# Patient Record
Sex: MALE | Race: WHITE
[De-identification: names, ages, dates, MRNs, and addresses within clinical notes are randomized per-mention and may not be internally consistent; named-entity substitution may affect disease eponyms.]

---

## 2017-09-18 ENCOUNTER — HOSPITAL ENCOUNTER (OUTPATIENT)
Dept: HOSPITAL 45 - C.EDB | Age: 22
Setting detail: OBSERVATION
LOS: 1 days | Discharge: HOME | End: 2017-09-19
Attending: SURGERY | Admitting: SURGERY
Payer: COMMERCIAL

## 2017-09-18 VITALS
BODY MASS INDEX: 20.75 KG/M2 | HEIGHT: 72 IN | BODY MASS INDEX: 20.75 KG/M2 | WEIGHT: 153.22 LBS | HEIGHT: 72 IN | WEIGHT: 153.22 LBS

## 2017-09-18 VITALS
SYSTOLIC BLOOD PRESSURE: 122 MMHG | HEART RATE: 64 BPM | OXYGEN SATURATION: 94 % | TEMPERATURE: 99.14 F | DIASTOLIC BLOOD PRESSURE: 70 MMHG

## 2017-09-18 VITALS — OXYGEN SATURATION: 95 %

## 2017-09-18 DIAGNOSIS — K35.80: Primary | ICD-10-CM

## 2017-09-18 LAB
ALP SERPL-CCNC: 97 U/L (ref 45–117)
ALT SERPL-CCNC: 110 U/L (ref 12–78)
ANION GAP SERPL CALC-SCNC: 12 MMOL/L (ref 3–11)
AST SERPL-CCNC: 182 U/L (ref 15–37)
BASOPHILS # BLD: 0.02 K/UL (ref 0–0.2)
BASOPHILS NFR BLD: 0.1 %
BUN SERPL-MCNC: 14 MG/DL (ref 7–18)
BUN/CREAT SERPL: 12 (ref 10–20)
CALCIUM SERPL-MCNC: 9.7 MG/DL (ref 8.5–10.1)
CHLORIDE SERPL-SCNC: 103 MMOL/L (ref 98–107)
CO2 SERPL-SCNC: 22 MMOL/L (ref 21–32)
COMPLETE: YES
CREAT CL PREDICTED SERPL C-G-VRATE: 95.7 ML/MIN
CREAT SERPL-MCNC: 1.2 MG/DL (ref 0.6–1.4)
EOSINOPHIL NFR BLD AUTO: 209 K/UL (ref 130–400)
GLUCOSE SERPL-MCNC: 118 MG/DL (ref 70–99)
HCT VFR BLD CALC: 50.4 % (ref 42–52)
IG%: 0.3 %
IMM GRANULOCYTES NFR BLD AUTO: 9.6 %
LYMPHOCYTES # BLD: 1.76 K/UL (ref 1.2–3.4)
MCH RBC QN AUTO: 31.2 PG (ref 25–34)
MCHC RBC AUTO-ENTMCNC: 35.9 G/DL (ref 32–36)
MCV RBC AUTO: 86.7 FL (ref 80–100)
MONOCYTES NFR BLD: 4.3 %
NEUTROPHILS # BLD AUTO: 0 %
NEUTROPHILS NFR BLD AUTO: 85.7 %
PMV BLD AUTO: 10.9 FL (ref 7.4–10.4)
POTASSIUM SERPL-SCNC: 4.2 MMOL/L (ref 3.5–5.1)
RBC # BLD AUTO: 5.81 M/UL (ref 4.7–6.1)
SODIUM SERPL-SCNC: 137 MMOL/L (ref 136–145)
WBC # BLD AUTO: 18.34 K/UL (ref 4.8–10.8)

## 2017-09-18 RX ADMIN — SODIUM CHLORIDE, SODIUM LACTATE, POTASSIUM CHLORIDE, AND CALCIUM CHLORIDE SCH MLS/HR: 600; 310; 30; 20 INJECTION, SOLUTION INTRAVENOUS at 23:47

## 2017-09-18 NOTE — EMERGENCY ROOM VISIT NOTE
History


Report prepared by Odalis:  Jorge Salgado


Under the Supervision of:  Dr. Dakotah Pack M.D.


First contact with patient:  16:43


Chief Complaint:  VOMITING


Stated Complaint:  DEHYDRATION - SEVERE STOMACH PAIN


Nursing Triage Summary:  


Pt c/o upper right abdominal pain since 0100 and has vomited "bile at least 30 


times since then". 





Pt denies diarrhea.





History of Present Illness


The patient is a 22 year old male who presents to the Emergency Room with 

complaints of sharp RLQ abdominal pain that began 16 hours ago. He rates his 

pain a 7/10 in severity. He states that his pain does not wrap around into the 

patient's back. He has been experiencing multiple episodes of vomiting which he 

describes as bright yellow in color. He denies any fevers, diarrhea, melena, 

hematochezia, or hematemesis. He notes that he has not urinated for quite some 

time. He denies any current medical problems.





   Source of History:  patient


   Onset:  16 hours ago


   Position:  abdomen (RLQ)


   Symptom Intensity:  7/10


   Quality:  sharp


   Timing:  constant


   Associated Symptoms:  + vomiting, No fevers, No melena, No hematochezia, No 

diarrhea





Review of Systems


See HPI for pertinent positives & negatives. A total of 10 systems reviewed and 

were otherwise negative.





Past Medical & Surgical


Medical Problems:


(1) No Known Active Medical Problems








Family History





No significant family history





Social History


Smoking Status:  Never Smoker


Smokeless Tobacco Use:  No


Alcohol Use:  occasionally


Drug Use:  marijuana


Marital Status:  single


Housing Status:  lives with friends


Occupation Status:  [employer] student





Current/Historical Medications


No Active Prescriptions or Reported Meds





Allergies


Coded Allergies:  


     No Known Allergies (Unverified , 9/18/17)





Physical Exam


Vital Signs











  Date Time  Temp Pulse Resp B/P (MAP) Pulse Ox O2 Delivery O2 Flow Rate FiO2


 


9/18/17 18:03  82 18 146/76 98 Room Air  


 


9/18/17 16:39 36.6 98 20 127/81 100 Room Air  











Physical Exam


Constitutional: Vital signs reviewed.


Eyes: Pupils are equal round reactive to light.  Conjunctiva are noninjected.  


ENT: Pharynx is clear without erythema or exudate.  Mucous membranes are moist.

  Neck supple without meningeal signs.


Respiratory: Clear to auscultation bilaterally.  Breath sounds are equal 

bilaterally. 


Cardiovascular: Regular rate and rhythm.  No rubs or gallops.


GI: Soft, nondistended with RLQ tenderness to palpation. No guarding.  Bowel 

sounds are present.


Musculoskeletal: No peripheral edema.  


Integumentary: No cyanosis.


Neurological: The patient is awake and alert.  No focal deficits.


Psychiatric: Normal affect.





Medical Decision & Procedures


ER Provider


Diagnostic Interpretation:


Radiology results as stated below per my review and the radiologist's 

interpretation:





CT SCAN OF THE ABDOMEN AND PELVIS WITH IV CONTRAST





CLINICAL HISTORY:   Right lower quadrant abdominal pain.





COMPARISON STUDY:  No priors.





TECHNIQUE: Following the IV administration of  120 cc of Optiray 320, CT scan of


the abdomen and pelvis is performed from the lung bases to the proximal femora.


Images are reviewed in the axial, sagittal, and coronal planes. IV contrast was


administered without complication. A dose lowering technique was utilized


adhering to the principles of ALARA. The Examination is significantly degraded


by motion artifact.





CT DOSE: 295.84 mGy.cm





FINDINGS:





Lung bases: The heart is normal in size and without pericardial effusion. The


lung bases are clear.





Liver: The contrast-enhanced liver is normal in size, contour, and attenuation.


There is no intrahepatic biliary ductal dilatation. The hepatic veins and portal


veins are patent.





Gallbladder: Unremarkable.





Spleen: Normal in size and attenuation.





Pancreas: Unremarkable.





Adrenal glands: Unremarkable.





Kidneys: The contrast enhanced kidneys are normal in size and without


hydronephrosis. The kidneys enhance symmetrically. There is a circumaortic left


renal vein.





Abdominal vasculature: The abdominal aorta is normal in course and caliber.





Bowel: The small bowel and colon are normal in course and caliber. The appendix


is  distended and fluid-filled, measuring up to 1.3 cm diameter. This is best


seen on axial image #343. A large calcified appendicolith is seen on image #340.


There is is mild appendiceal wall thickening as well as periappendiceal


inflammation, and the appearance is consistent with acute appendicitis.





Peritoneum: There is no intraperitoneal free air. A small volume of free fluid


is seen in the pelvis.





Lymphadenopathy: None.





Pelvic viscera: The bladder, prostate, and seminal vesicles are normal as


imaged. Bilateral hydroceles are suspected.





Skeletal structures: No lytic or blastic lesions are seen. There is a left-sided


pars defect at L5.








IMPRESSION:





1. Findings are consistent with acute appendicitis. There is no intraperitoneal


free air or evidence of abscess.





2. There is a small volume of free fluid in the pelvis, likely on a reactive


basis.





Electronically signed by:  Lester Garcia M.D.


9/18/2017 6:05 PM





Dictated Date/Time:  9/18/2017 5:59 PM





Laboratory Results


9/18/17 16:50








Red Blood Count 5.81, Mean Corpuscular Volume 86.7, Mean Corpuscular Hemoglobin 

31.2, Mean Corpuscular Hemoglobin Concent 35.9, Mean Platelet Volume 10.9, 

Neutrophils (%) (Auto) 85.7, Lymphocytes (%) (Auto) 9.6, Monocytes (%) (Auto) 

4.3, Eosinophils (%) (Auto) 0.0, Basophils (%) (Auto) 0.1, Neutrophils # (Auto) 

15.73, Lymphocytes # (Auto) 1.76, Monocytes # (Auto) 0.78, Eosinophils # (Auto) 

0.00, Basophils # (Auto) 0.02





9/18/17 16:50

















Test


  9/18/17


16:50


 


White Blood Count


  18.34 K/uL


(4.8-10.8)


 


Red Blood Count


  5.81 M/uL


(4.7-6.1)


 


Hemoglobin


  18.1 g/dL


(14.0-18.0)


 


Hematocrit 50.4 % (42-52) 


 


Mean Corpuscular Volume


  86.7 fL


()


 


Mean Corpuscular Hemoglobin


  31.2 pg


(25-34)


 


Mean Corpuscular Hemoglobin


Concent 35.9 g/dl


(32-36)


 


Platelet Count


  209 K/uL


(130-400)


 


Mean Platelet Volume


  10.9 fL


(7.4-10.4)


 


Neutrophils (%) (Auto) 85.7 % 


 


Lymphocytes (%) (Auto) 9.6 % 


 


Monocytes (%) (Auto) 4.3 % 


 


Eosinophils (%) (Auto) 0.0 % 


 


Basophils (%) (Auto) 0.1 % 


 


Neutrophils # (Auto)


  15.73 K/uL


(1.4-6.5)


 


Lymphocytes # (Auto)


  1.76 K/uL


(1.2-3.4)


 


Monocytes # (Auto)


  0.78 K/uL


(0.11-0.59)


 


Eosinophils # (Auto)


  0.00 K/uL


(0-0.5)


 


Basophils # (Auto)


  0.02 K/uL


(0-0.2)


 


RDW Standard Deviation


  39.0 fL


(36.4-46.3)


 


RDW Coefficient of Variation


  12.3 %


(11.5-14.5)


 


Immature Granulocyte % (Auto) 0.3 % 


 


Immature Granulocyte # (Auto)


  0.05 K/uL


(0.00-0.02)


 


Anion Gap


  12.0 mmol/L


(3-11)


 


Est Creatinine Clear Calc


Drug Dose 95.7 ml/min 


 


 


Estimated GFR (


American) 99.6 


 


 


Estimated GFR (Non-


American 85.9 


 


 


BUN/Creatinine Ratio 12.0 (10-20) 


 


Calcium Level


  9.7 mg/dl


(8.5-10.1)


 


Total Bilirubin


  1.1 mg/dl


(0.2-1)


 


Direct Bilirubin


  0.2 mg/dl


(0-0.2)


 


Aspartate Amino Transf


(AST/SGOT) 182 U/L


(15-37)


 


Alanine Aminotransferase


(ALT/SGPT) 110 U/L


(12-78)


 


Alkaline Phosphatase


  97 U/L


()


 


Total Protein


  8.4 gm/dl


(6.4-8.2)


 


Albumin


  4.7 gm/dl


(3.4-5.0)


 


Lipase


  136 U/L


()





Laboratory results as reviewed by me.





Medications Administered











 Medications


  (Trade)  Dose


 Ordered  Sig/Katlyn


 Route  Start Time


 Stop Time Status Last Admin


Dose Admin


 


 Morphine Sulfate


  (MoRPHine


 SULFATE INJ)  4 mg  ONE  STAT


 IV  9/18/17 16:51


 9/18/17 16:53 DC 9/18/17 17:09


4 MG


 


 Ondansetron HCl


  (Zofran Inj)  4 mg  NOW  STAT


 IV  9/18/17 16:51


 9/18/17 16:53 DC 9/18/17 17:08


4 MG


 


 Sodium Chloride  1,000 ml @ 


 999 mls/hr  Q1H1M STAT


 IV  9/18/17 16:51


 9/18/17 17:51 DC 9/18/17 17:08


999 MLS/HR











ED Course


1643: The patient was evaluated in room C11B. A complete history and physical 

exam was performed.





1651: Ordered Sodium Chloride 1000 ml @ 999 mls/hr IV, Zofran Inj 4 mg IV, 

Morphine Sulfate 4 mg IV





1818: The patient is feeling better after receiving the pain medication. I 

spoke with Dr. Guaman of General Surgery at this time. We discussed the 

patient's case. They will be evaluating the patient for further management and 

care.





Medical Decision


This is a 22-year-old male who presents with right-sided abdominal pain and 

vomiting.  Differential diagnosis includes acute appendicitis, abscess, 

perforation, peritonitis, kidney stone, partial bowel obstruction.  I did 

perform a limited focused review of portions of the patient's old chart on the 

electronic medical record. The patient has had no recent pertinent visits to 

this hospital.





I did evaluate the patient as noted above.  IV access was established.  I did 

treat the patient with IV morphine and Zofran.  I did also treat him with 

normal saline IV.  I did order and review the patient's blood work as noted in 

the electronic medical record.  His white blood cell count is over 18,000.   I 

did order a CT of the abdomen and pelvis.  I did review the images myself as 

well as the radiology report as described above. He does have acute 

appendicitis.  There is no evidence of abscess or perforation.  I did discuss 

the case with the patient.  He is feeling much better at this time.  I did 

discuss case with the surgeon on call.





Medication Reconcilliation


Current Medication List:  was personally reviewed by me





Blood Pressure Screening


Patient's blood pressure:  Elevated blood pressure


Blood pressure disposition:  Referred to PCP





Consults


Time Called:  1816


Consulting Physician:  Dr. Guaman - General Surgery


Returned Call:  1818


I spoke with Dr. Guaman of General Surgery. We discussed the patient and his 

results. The patient will be further evaluated by them.





Impression





 Primary Impression:  


 Acute appendicitis


 Additional Impression:  


 Abnormal LFTs





Scribe Attestation


The scribe's documentation has been prepared under my direct and personally 

reviewed by me in its entirety. I confirm that the note above accurately 

reflects all work, treatment, procedures, and medical decision making performed 

by me.





Departure Information


Dispostion


Being Evaluated By Surgeon





Prescriptions





No Active Prescriptions or Reported Meds





Referrals


[facility] (PCP)





Patient Instructions


[facility]





Problem Qualifiers








 Primary Impression:  


 Acute appendicitis


 Acute appendicitis type:  unspecified acute appendicitis type  Qualified Codes

:  K35.80 - Unspecified acute appendicitis

## 2017-09-18 NOTE — HISTORY AND PHYSICAL
History & Physical


Date


Sep 18, 2017.





History of Present Illness


The patient is a 22 year old male with complaints of mid abdominal pain 

starting last night with progression of pain into RLQ today.





Past Medical/Surgical History


Medical Problems:


(1) No Known Active Medical Problems








Additional History


Hepatic Disease:  No


Endocrine Disorder:  No


Kidney Disease:  No


Hypertension:  No


Heart Disease:  No


Bleeding Tendencies:  No


Infectious Diseases:  No





Allergies


Coded Allergies:  


     No Known Allergies (Unverified , 9/18/17)





Home Medications


No Active Prescriptions or Reported Meds





Physical Examination


Skin:  warm/dry, no rash


Eyes:  normal inspection, EOMI


Head:  normocephalic


Neck:  supple, trachea midline


Respiratory/Chest:  no respiratory distress


Cardiovascular:  regular rate, rhythm


Abdomen / GI:  + pertinent finding (+guarding. + TTP RLQ.  + Rovsing)


Neurologic/Psych:  alert, oriented x 3





Diagnosis


acute appendicitis on CT scan with wbc 18,000


discussed diagnosis and tx options


discussed risks ( bleeding/infection/dvt/pe/injury to another organ, staple 

leak etc..)


questions answered.


to OR tonight for lap appy.

## 2017-09-18 NOTE — [facility] OPERATIVE REPORT
Operative Report


Operative Date


Sep 18, 2017.





Pre-Operative Diagnosis





Acute Appendicitis





Post-Operative Diagnosis





Acute Appendicitis





Procedure(s) Performed





Laparoscopic Appendectomy





Surgeon


Dr. Guaman





Estimated Blood Loss


5ml





Findings


acutely inflammed appendix





Specimens





A. Appendix





Anesthesia


get





Disposition


Recovery Room / PACU





Description of Procedure


After informed consent was obtained the patient was taken the operating suite 

placed in supine position.  After successful intubation a Hernandez catheter was 

placed and the abdomen was shaved and sterilely prepped and draped in usual 

fashion.  An infraumbilical incision was made with an 11 blade scalpel and 

carried down through the soft tissue using electrocautery.  The anterior rectus 

fascia was opened using electrocautery and 2 #0 Vicryl stay sutures were 

placed.  The the peritoneum was elevated with hemostats and incised under 

direct vision using a Metzenbaum scissor.  A finger sweep was performed and 

then a 12 mm Fitzgerald trocar was placed.  The abdomen was insufflated 18 mmHg.  

The laparoscope was inserted and the abdomen was examined in 360.  A 

suprapubic 5 mm port and a left lower quadrant 12 mm port were placed under 

direct vision.  We began by examining the right lower quadrant.  We immediately 

noticed a large acute appendicitis was some necrosis.  There was no 

perforation.  There was some murky fluid in the pelvis which we suctioned and 

irrigated out.  I was able to grab the appendix and make a small window in the 

mesentery of the appendix with a Maryland dissector.  A CORINNE Brown cartridge 60 

mm was used to transect the appendix at its base.  I was then able using a 

second firing of the same cartridge to transect the mesentery of the appendix.  

Both staple lines looked intact and there was no bleeding.  I did run the small 

bowel backward from the terminal ileum for about 6 feet.  There were no 

abnormalities.  Liver gallbladder stomach and peritoneal surfaces all appeared 

normal.  I performed a final irrigation in the right lower quadrant as well as 

in the pelvis and suctioned it out.  The appendix was placed into an Endo Catch 

bag and removed from the camera port site.  All trochars were removed and the 

abdomen desufflated.  The fascia the camera port as well as left lower quadrant 

was closed with 0 Vicryl figure 8 fashion.  All the wounds were irrigated and 

closed using 4-0 Monocryl.  Marcaine was injected around him for postoperative 

analgesia and skin glue used as a dressing.  The patient was awaken and 

transferred recovery in stable condition


I attest to the content of the Intraoperative Record and any orders documented 

therein.  Any exceptions are noted below.

## 2017-09-18 NOTE — DIAGNOSTIC IMAGING REPORT
CT SCAN OF THE ABDOMEN AND PELVIS WITH IV CONTRAST



CLINICAL HISTORY:   Right lower quadrant abdominal pain.



COMPARISON STUDY:  No priors.



TECHNIQUE: Following the IV administration of  120 cc of Optiray 320, CT scan of

the abdomen and pelvis is performed from the lung bases to the proximal femora.

Images are reviewed in the axial, sagittal, and coronal planes. IV contrast was

administered without complication. A dose lowering technique was utilized

adhering to the principles of ALARA. The Examination is significantly degraded

by motion artifact.



CT DOSE: 295.84 mGy.cm



FINDINGS:



Lung bases: The heart is normal in size and without pericardial effusion. The

lung bases are clear.



Liver: The contrast-enhanced liver is normal in size, contour, and attenuation.

There is no intrahepatic biliary ductal dilatation. The hepatic veins and portal

veins are patent.



Gallbladder: Unremarkable.



Spleen: Normal in size and attenuation.



Pancreas: Unremarkable.



Adrenal glands: Unremarkable.



Kidneys: The contrast enhanced kidneys are normal in size and without

hydronephrosis. The kidneys enhance symmetrically. There is a circumaortic left

renal vein.



Abdominal vasculature: The abdominal aorta is normal in course and caliber.



Bowel: The small bowel and colon are normal in course and caliber. The appendix

is  distended and fluid-filled, measuring up to 1.3 cm diameter. This is best

seen on axial image #343. A large calcified appendicolith is seen on image #340.

There is is mild appendiceal wall thickening as well as periappendiceal

inflammation, and the appearance is consistent with acute appendicitis.



Peritoneum: There is no intraperitoneal free air. A small volume of free fluid

is seen in the pelvis.



Lymphadenopathy: None.



Pelvic viscera: The bladder, prostate, and seminal vesicles are normal as

imaged. Bilateral hydroceles are suspected.



Skeletal structures: No lytic or blastic lesions are seen. There is a left-sided

pars defect at L5.





IMPRESSION:



1. Findings are consistent with acute appendicitis. There is no intraperitoneal

free air or evidence of abscess.



2. There is a small volume of free fluid in the pelvis, likely on a reactive

basis.







Electronically signed by:  Lester Garcia M.D.

9/18/2017 6:05 PM



Dictated Date/Time:  9/18/2017 5:59 PM

## 2017-09-18 NOTE — ANESTHESIOLOGY PROGRESS NOTE
Anesthesia Post Op Note


Date & Time


Sep 18, 2017 at 23:21





Vital Signs


Pain Intensity:  0





Vital Signs Past 12 Hours








  Date Time  Temp Pulse Resp B/P (MAP) Pulse Ox O2 Delivery O2 Flow Rate FiO2


 


9/18/17 23:00  58 18 128/71 96 Room Air  


 


9/18/17 22:50  56 18 129/69 95   


 


9/18/17 22:42 36.6 80 20 145/74 99 Nasal Cannula 2 


 


9/18/17 21:02  71 18 146/86 95   


 


9/18/17 19:29  78 19 145/84 97 Room Air  


 


9/18/17 18:03  82 18 146/76 98 Room Air  


 


9/18/17 16:39 36.6 98 20 127/81 100 Room Air  











Notes


Mental Status:  alert / awake / arousable, participated in evaluation


Pt Amnestic to Procedure:  Yes


Nausea / Vomiting:  adequately controlled


Pain:  adequately controlled


Airway Patency, RR, SpO2:  stable & adequate


BP & HR:  stable & adequate


Hydration State:  stable & adequate


Anesthetic Complications:  no major complications apparent

## 2017-09-19 VITALS
SYSTOLIC BLOOD PRESSURE: 110 MMHG | HEART RATE: 72 BPM | DIASTOLIC BLOOD PRESSURE: 56 MMHG | OXYGEN SATURATION: 95 % | TEMPERATURE: 97.52 F

## 2017-09-19 VITALS
HEART RATE: 79 BPM | TEMPERATURE: 99.14 F | DIASTOLIC BLOOD PRESSURE: 70 MMHG | SYSTOLIC BLOOD PRESSURE: 125 MMHG | OXYGEN SATURATION: 95 %

## 2017-09-19 VITALS
TEMPERATURE: 98.24 F | DIASTOLIC BLOOD PRESSURE: 54 MMHG | OXYGEN SATURATION: 96 % | SYSTOLIC BLOOD PRESSURE: 116 MMHG | HEART RATE: 65 BPM

## 2017-09-19 VITALS
DIASTOLIC BLOOD PRESSURE: 52 MMHG | OXYGEN SATURATION: 95 % | SYSTOLIC BLOOD PRESSURE: 123 MMHG | HEART RATE: 65 BPM | TEMPERATURE: 98.6 F

## 2017-09-19 VITALS
TEMPERATURE: 98.78 F | DIASTOLIC BLOOD PRESSURE: 72 MMHG | OXYGEN SATURATION: 95 % | HEART RATE: 61 BPM | SYSTOLIC BLOOD PRESSURE: 136 MMHG

## 2017-09-19 VITALS
HEART RATE: 54 BPM | SYSTOLIC BLOOD PRESSURE: 114 MMHG | OXYGEN SATURATION: 96 % | TEMPERATURE: 98.24 F | DIASTOLIC BLOOD PRESSURE: 61 MMHG

## 2017-09-19 VITALS
HEART RATE: 54 BPM | OXYGEN SATURATION: 96 % | TEMPERATURE: 98.24 F | SYSTOLIC BLOOD PRESSURE: 114 MMHG | DIASTOLIC BLOOD PRESSURE: 61 MMHG

## 2017-09-19 LAB
APPEARANCE UR: CLEAR
BASOPHILS # BLD: 0.01 K/UL (ref 0–0.2)
BASOPHILS NFR BLD: 0.1 %
BILIRUB UR-MCNC: (no result) MG/DL
COLOR UR: (no result)
COMPLETE: YES
EOSINOPHIL NFR BLD AUTO: 185 K/UL (ref 130–400)
HCT VFR BLD CALC: 42.5 % (ref 42–52)
IG%: 0.2 %
IMM GRANULOCYTES NFR BLD AUTO: 5.3 %
LYMPHOCYTES # BLD: 0.69 K/UL (ref 1.2–3.4)
MANUAL MICROSCOPIC REQUIRED?: NO
MCH RBC QN AUTO: 30.4 PG (ref 25–34)
MCHC RBC AUTO-ENTMCNC: 34.1 G/DL (ref 32–36)
MCV RBC AUTO: 89.1 FL (ref 80–100)
MONOCYTES NFR BLD: 5.4 %
NEUTROPHILS # BLD AUTO: 0 %
NEUTROPHILS NFR BLD AUTO: 89 %
NITRITE UR QL STRIP: (no result)
PH UR STRIP: 6.5 [PH] (ref 4.5–7.5)
PMV BLD AUTO: 10.9 FL (ref 7.4–10.4)
RBC # BLD AUTO: 4.77 M/UL (ref 4.7–6.1)
REVIEW REQ?: YES
SP GR UR STRIP: 1.04 (ref 1–1.03)
URINE BILL WITH OR WITHOUT MIC: (no result)
URINE EPITHELIAL CELL AUTO: >30 /LPF (ref 0–5)
UROBILINOGEN UR-MCNC: (no result) MG/DL
WBC # BLD AUTO: 13.11 K/UL (ref 4.8–10.8)

## 2017-09-19 RX ADMIN — HYDROCODONE BITARTRATE AND ACETAMINOPHEN PRN TAB: 5; 325 TABLET ORAL at 11:03

## 2017-09-19 RX ADMIN — MORPHINE SULFATE PRN MG: 2 INJECTION, SOLUTION INTRAMUSCULAR; INTRAVENOUS at 00:32

## 2017-09-19 RX ADMIN — CEFOXITIN SODIUM SCH MLS/HR: 1 POWDER, FOR SOLUTION INTRAVENOUS at 00:27

## 2017-09-19 RX ADMIN — HYDROCODONE BITARTRATE AND ACETAMINOPHEN PRN TAB: 5; 325 TABLET ORAL at 09:22

## 2017-09-19 RX ADMIN — MORPHINE SULFATE PRN MG: 2 INJECTION, SOLUTION INTRAMUSCULAR; INTRAVENOUS at 07:27

## 2017-09-19 RX ADMIN — CEFOXITIN SODIUM SCH MLS/HR: 1 POWDER, FOR SOLUTION INTRAVENOUS at 12:30

## 2017-09-19 RX ADMIN — SODIUM CHLORIDE, SODIUM LACTATE, POTASSIUM CHLORIDE, AND CALCIUM CHLORIDE SCH MLS/HR: 600; 310; 30; 20 INJECTION, SOLUTION INTRAVENOUS at 05:55

## 2017-09-19 RX ADMIN — CEFOXITIN SODIUM SCH MLS/HR: 1 POWDER, FOR SOLUTION INTRAVENOUS at 07:21

## 2017-09-19 NOTE — SURGERY PROGRESS NOTE
Surgery Progress Note


Date of Service


Sep 19, 2017.





Subjective


Post OP Day:  1


+ complaints (pain when getting out of bed), + pain controlled, + diet (clears)

, No nausea





Objective


Vital Signs:











  Date Time  Temp Pulse Resp B/P (MAP) Pulse Ox O2 Delivery O2 Flow Rate FiO2


 


9/19/17 07:43 36.4 72 19 110/56 (74) 95 Room Air  


 


9/19/17 07:15      Room Air  


 


9/19/17 02:30 36.8 65 16 116/54 (74) 96 Nasal Cannula  


 


9/19/17 01:30 37.0 65 16 123/52 (75) 95 Room Air  


 


9/19/17 00:30 37.3 79 16 125/70 (88) 95 Room Air  


 


9/19/17 00:00 37.1 61 16 136/72 (93) 95 Room Air  


 


9/18/17 23:30      Room Air  


 


9/18/17 23:30 37.3 64 16 122/70 (87) 94 Room Air  


 


9/18/17 23:30      Room Air 94.0 


 


9/18/17 23:30 37.3 64 16 122/70  Room Air  


 


9/18/17 23:10  63 17 138/84 95 Room Air  


 


9/18/17 23:00  58 18 128/71 96 Room Air  


 


9/18/17 22:50  56 18 129/69 95   


 


9/18/17 22:42 36.6 80 20 145/74 99 Nasal Cannula 2 


 


9/18/17 21:02  71 18 146/86 95   


 


9/18/17 19:29  78 19 145/84 97 Room Air  


 


9/18/17 18:03  82 18 146/76 98 Room Air  


 


9/18/17 16:39 36.6 98 20 127/81 100 Room Air  








Abdomen:  non distended, soft


Laboratory Results:





Results Past 24 Hours








Test


  9/18/17


16:50 9/19/17


05:56 9/19/17


06:40 Range/Units


 


 


White Blood Count 18.34 13.11  4.8-10.8  K/uL


 


Red Blood Count 5.81 4.77  4.7-6.1  M/uL


 


Hemoglobin 18.1 14.5  14.0-18.0  g/dL


 


Hematocrit 50.4 42.5  42-52  %


 


Mean Corpuscular Volume 86.7 89.1    fL


 


Mean Corpuscular Hemoglobin 31.2 30.4  25-34  pg


 


Mean Corpuscular Hemoglobin


Concent 35.9


  34.1


  


  32-36  g/dl


 


 


Platelet Count 209 185  130-400  K/uL


 


Mean Platelet Volume 10.9 10.9  7.4-10.4  fL


 


Neutrophils (%) (Auto) 85.7 89.0   %


 


Lymphocytes (%) (Auto) 9.6 5.3   %


 


Monocytes (%) (Auto) 4.3 5.4   %


 


Eosinophils (%) (Auto) 0.0 0.0   %


 


Basophils (%) (Auto) 0.1 0.1   %


 


Neutrophils # (Auto) 15.73 11.67  1.4-6.5  K/uL


 


Lymphocytes # (Auto) 1.76 0.69  1.2-3.4  K/uL


 


Monocytes # (Auto) 0.78 0.71  0.11-0.59  K/uL


 


Eosinophils # (Auto) 0.00 0.00  0-0.5  K/uL


 


Basophils # (Auto) 0.02 0.01  0-0.2  K/uL


 


RDW Standard Deviation 39.0 40.6  36.4-46.3  fL


 


RDW Coefficient of Variation 12.3 12.5  11.5-14.5  %


 


Immature Granulocyte % (Auto) 0.3 0.2   %


 


Immature Granulocyte # (Auto) 0.05 0.03  0.00-0.02  K/uL


 


Sodium Level 137   136-145  mmol/L


 


Potassium Level 4.2   3.5-5.1  mmol/L


 


Chloride Level 103     mmol/L


 


Carbon Dioxide Level 22   21-32  mmol/L


 


Anion Gap 12.0   3-11  mmol/L


 


Blood Urea Nitrogen 14   7-18  mg/dl


 


Creatinine


  1.20


  


  


  0.60-1.40


mg/dl


 


Est Creatinine Clear Calc


Drug Dose 95.7


  


  


   ml/min


 


 


Estimated GFR (


American) 99.6


  


  


   


 


 


Estimated GFR (Non-


American 85.9


  


  


   


 


 


BUN/Creatinine Ratio 12.0   10-20  


 


Random Glucose 118   70-99  mg/dl


 


Calcium Level 9.7   8.5-10.1  mg/dl


 


Total Bilirubin 1.1   0.2-1  mg/dl


 


Direct Bilirubin 0.2   0-0.2  mg/dl


 


Aspartate Amino Transf


(AST/SGOT) 182


  


  


  15-37  U/L


 


 


Alanine Aminotransferase


(ALT/SGPT) 110


  


  


  12-78  U/L


 


 


Alkaline Phosphatase 97     U/L


 


Total Protein 8.4   6.4-8.2  gm/dl


 


Albumin 4.7   3.4-5.0  gm/dl


 


Lipase 136     U/L


 


Urine Color   DK YELLOW  


 


Urine Appearance   CLEAR CLEAR  


 


Urine pH   6.5 4.5-7.5  


 


Urine Specific Gravity   1.040 1.000-1.030  


 


Urine Protein   TRACE NEG  


 


Urine Glucose (UA)   NEG NEG  


 


Urine Ketones   1+ NEG  


 


Urine Occult Blood   NEG NEG  


 


Urine Nitrite   NEG NEG  


 


Urine Bilirubin   NEG NEG  


 


Urine Urobilinogen   NEG NEG  


 


Urine Leukocyte Esterase   NEG NEG  


 


Urine WBC (Auto)   5-10 0-5  /hpf


 


Urine RBC (Auto)   0-4 0-4  /hpf


 


Urine Hyaline Casts (Auto)   1-5 0-5  /lpf


 


Urine Epithelial Cells (Auto)   >30 0-5  /lpf


 


Urine Bacteria (Auto)   NEG NEG  


 


Urine Renal Epithelial Cells   5-10 0-5  /lpf











Assessment & Plan


s/p lap appy


   WBC improved


   advance diet


   home if tolerates lunch

## 2017-09-20 NOTE — DISCHARGE SUMMARY
PRIMARY DISCHARGE DIAGNOSIS:  Acute appendicitis.

 

PROCEDURE PERFORMED:  Laparoscopic appendectomy.

 

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

the Emergency Department with mid abdominal pain that began overnight and

localized in the right lower quadrant throughout the day.  His white count

was 18,000.  CT was consistent with acute appendicitis.  He was taken to the

operating room that evening for laparoscopic appendectomy.  Procedure was

well tolerated.  He was transferred to the surgical floor.  IV Mefoxin was

continued overnight.  On postoperative day 1, his white count improved to

13,000.  He was able to tolerate advancing diet during the day as well as

oral analgesics.  He was stable for discharge in the afternoon.  His abdomen

was soft.  Incisions were dry.

 

DISCHARGE INSTRUCTIONS:  Discharge home.  Follow up with Dr. Guaman in

approximately 2 weeks.

 

DISCHARGE MEDICATIONS:  Norco 1-2 tablets every 4 hours as needed and Motrin

600 mg every 6 hours as needed.

## 2018-05-07 ENCOUNTER — HOSPITAL ENCOUNTER (OUTPATIENT)
Dept: HOSPITAL 45 - C.LAB | Age: 23
Discharge: HOME | End: 2018-05-07
Attending: GENERAL ACUTE CARE HOSPITAL
Payer: COMMERCIAL

## 2018-05-07 DIAGNOSIS — Z02.83: Primary | ICD-10-CM

## 2023-05-26 NOTE — DISCHARGE INSTRUCTIONS
Discharge Instructions


Date of Service


Sep 19, 2017.





Admission


Reason for Admission:  Acute Appendicitis





Discharge


Discharge Diagnosis / Problem:  laparoscopic appendectomy





Discharge Goals


Goal(s):  Decrease discomfort





Activity Recommendations


Activity Limitations:  as noted below


Lifting Limitations:  no more than 10 pounds


Shower/Bathe:  no limitations


Driving or Machine Use:  resume 3 days after discharge (if not taking Norco)





.





Instructions / Follow-Up


Instructions / Follow-Up


Dr. Guaman in 2 weeks, call [phone] to schedule, [address] 


You may take ibuprofen 600 mg every 6 hours as needed  in addition to [facility]





Current Hospital Diet


Patient's current hospital diet: Clear Liquid Diet





Discharge Diet


Recommended Diet:  Regular Diet





Procedures


Procedures Performed:  


Laparoscopic Appendectomy





Pending Studies


Studies pending at discharge:  yes


List of pending studies:  


pathology





Medical Emergencies








.


Who to Call and When:





Medical Emergencies:  If at any time you feel your situation is an emergency, 

please call 911 immediately.





.





Non-Emergent Contact


Non-Emergency issues call your:  Surgeon


Call Non-Emergent contact if:  you have a fever, temperature is above 101.5, 

your pain is not controlled, wound has increased redness, wound has increased 

pain, you have any medication questions


.








"Provider Documentation" section prepared by Robert Denise.








.





VTE Core Measure


Inpt VTE Proph given/why not?:  SCD's





[place] Drug Monitoring Program


Search Results:  no issues identified He needs follow up with me in the next few weeks.     Thanks!  Donna Persuad, TOOTIE